# Patient Record
Sex: FEMALE | Race: WHITE | Employment: FULL TIME | URBAN - METROPOLITAN AREA
[De-identification: names, ages, dates, MRNs, and addresses within clinical notes are randomized per-mention and may not be internally consistent; named-entity substitution may affect disease eponyms.]

---

## 2021-08-31 ENCOUNTER — TRANSCRIBE ORDER (OUTPATIENT)
Dept: SCHEDULING | Age: 61
End: 2021-08-31

## 2021-08-31 DIAGNOSIS — M54.12 BRACHIAL NEURITIS: Primary | ICD-10-CM

## 2021-09-02 ENCOUNTER — HOSPITAL ENCOUNTER (OUTPATIENT)
Dept: MRI IMAGING | Age: 61
Discharge: HOME | End: 2021-09-02
Payer: COMMERCIAL

## 2021-09-02 DIAGNOSIS — M54.12 BRACHIAL NEURITIS: ICD-10-CM

## 2021-09-02 PROCEDURE — 72141 MRI NECK SPINE W/O DYE: CPT

## 2024-10-31 ENCOUNTER — OFFICE VISIT (OUTPATIENT)
Age: 64
End: 2024-10-31
Payer: COMMERCIAL

## 2024-10-31 VITALS
HEIGHT: 69 IN | SYSTOLIC BLOOD PRESSURE: 124 MMHG | BODY MASS INDEX: 30.36 KG/M2 | OXYGEN SATURATION: 98 % | DIASTOLIC BLOOD PRESSURE: 82 MMHG | WEIGHT: 205 LBS | HEART RATE: 74 BPM

## 2024-10-31 DIAGNOSIS — E04.2 MULTIPLE THYROID NODULES: Primary | ICD-10-CM

## 2024-10-31 DIAGNOSIS — E04.9 THYROID GOITER: ICD-10-CM

## 2024-10-31 PROCEDURE — 99204 OFFICE O/P NEW MOD 45 MIN: CPT | Performed by: STUDENT IN AN ORGANIZED HEALTH CARE EDUCATION/TRAINING PROGRAM

## 2024-10-31 RX ORDER — BIOTIN 1 MG
TABLET ORAL
COMMUNITY

## 2024-10-31 RX ORDER — FEXOFENADINE HCL 180 MG/1
TABLET ORAL
COMMUNITY
Start: 2024-09-09

## 2024-10-31 RX ORDER — TOPIRAMATE 50 MG/1
TABLET, FILM COATED ORAL
COMMUNITY

## 2024-10-31 RX ORDER — ESCITALOPRAM OXALATE 10 MG/1
TABLET ORAL
COMMUNITY

## 2024-10-31 RX ORDER — BUSPIRONE HYDROCHLORIDE 5 MG/1
5 TABLET ORAL
COMMUNITY

## 2024-10-31 NOTE — PROGRESS NOTES
[facility] [address]  [place] [address]  Office Phone: [phone]

## 2024-12-12 ENCOUNTER — HOSPITAL ENCOUNTER (OUTPATIENT)
Facility: HOSPITAL | Age: 64
Discharge: HOME | End: 2024-12-15
Attending: STUDENT IN AN ORGANIZED HEALTH CARE EDUCATION/TRAINING PROGRAM
Payer: COMMERCIAL

## 2024-12-12 VITALS — RESPIRATION RATE: 20 BRPM

## 2024-12-12 DIAGNOSIS — E04.2 MULTIPLE THYROID NODULES: ICD-10-CM

## 2024-12-12 PROCEDURE — 88172 CYTP DX EVAL FNA 1ST EA SITE: CPT

## 2024-12-12 PROCEDURE — 88173 CYTOPATH EVAL FNA REPORT: CPT

## 2024-12-12 PROCEDURE — 10007 FNA BX W/FLUOR GDN 1ST LES: CPT

## 2024-12-24 ENCOUNTER — TELEPHONE (OUTPATIENT)
Age: 64
End: 2024-12-24

## 2024-12-24 NOTE — TELEPHONE ENCOUNTER
----- Message from Dr. 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 The genetic testing on the specimen is still pending

## 2024-12-27 ENCOUNTER — TELEPHONE (OUTPATIENT)
Age: 64
End: 2024-12-27

## 2024-12-27 NOTE — TELEPHONE ENCOUNTER
----- Message from YOLANDA ARCINIEGA MA sent at 12/24/2024  8:14 AM EST -----  LM for patient to call back to discuss results. Office number and direct number left on vmail.  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 The genetic testing on the specimen is still pending

## 2024-12-27 NOTE — TELEPHONE ENCOUNTER
I have made several attempts to call this patient to give results. I sent a [employer] message, left a voicemail, as well as an attempt to call her emergency contact to no avail. I will be mailing the results with Dr. Alvarez's message added to the patient.

## 2025-01-08 ENCOUNTER — TELEPHONE (OUTPATIENT)
Age: 65
End: 2025-01-08